# Patient Record
Sex: FEMALE | Race: BLACK OR AFRICAN AMERICAN | NOT HISPANIC OR LATINO
[De-identification: names, ages, dates, MRNs, and addresses within clinical notes are randomized per-mention and may not be internally consistent; named-entity substitution may affect disease eponyms.]

---

## 2017-11-18 ENCOUNTER — RESULT REVIEW (OUTPATIENT)
Age: 22
End: 2017-11-18

## 2019-10-12 ENCOUNTER — RESULT REVIEW (OUTPATIENT)
Age: 24
End: 2019-10-12

## 2020-11-06 ENCOUNTER — TRANSCRIPTION ENCOUNTER (OUTPATIENT)
Age: 25
End: 2020-11-06

## 2020-11-08 ENCOUNTER — TRANSCRIPTION ENCOUNTER (OUTPATIENT)
Age: 25
End: 2020-11-08

## 2021-01-19 ENCOUNTER — OUTPATIENT (OUTPATIENT)
Dept: OUTPATIENT SERVICES | Facility: HOSPITAL | Age: 26
LOS: 1 days | Discharge: HOME | End: 2021-01-19
Payer: COMMERCIAL

## 2021-01-19 DIAGNOSIS — F10.20 ALCOHOL DEPENDENCE, UNCOMPLICATED: ICD-10-CM

## 2021-01-19 DIAGNOSIS — F12.20 CANNABIS DEPENDENCE, UNCOMPLICATED: ICD-10-CM

## 2021-01-29 DIAGNOSIS — F19.950 OTHER PSYCHOACTIVE SUBSTANCE USE, UNSPECIFIED WITH PSYCHOACTIVE SUBSTANCE-INDUCED PSYCHOTIC DISORDER WITH DELUSIONS: ICD-10-CM

## 2021-01-29 DIAGNOSIS — F32.3 MAJOR DEPRESSIVE DISORDER, SINGLE EPISODE, SEVERE WITH PSYCHOTIC FEATURES: ICD-10-CM

## 2021-01-29 DIAGNOSIS — F12.959 CANNABIS USE, UNSPECIFIED WITH PSYCHOTIC DISORDER, UNSPECIFIED: ICD-10-CM

## 2021-02-15 ENCOUNTER — RESULT REVIEW (OUTPATIENT)
Age: 26
End: 2021-02-15

## 2021-03-02 ENCOUNTER — OUTPATIENT (OUTPATIENT)
Dept: OUTPATIENT SERVICES | Facility: HOSPITAL | Age: 26
LOS: 1 days | Discharge: HOME | End: 2021-03-02
Payer: MEDICAID

## 2021-03-02 PROBLEM — Z00.00 ENCOUNTER FOR PREVENTIVE HEALTH EXAMINATION: Status: ACTIVE | Noted: 2021-03-02

## 2021-03-02 PROCEDURE — 90791 PSYCH DIAGNOSTIC EVALUATION: CPT | Mod: 95

## 2021-03-03 ENCOUNTER — APPOINTMENT (OUTPATIENT)
Dept: NEUROLOGY | Facility: CLINIC | Age: 26
End: 2021-03-03
Payer: COMMERCIAL

## 2021-03-03 VITALS
BODY MASS INDEX: 23.55 KG/M2 | DIASTOLIC BLOOD PRESSURE: 80 MMHG | HEART RATE: 104 BPM | SYSTOLIC BLOOD PRESSURE: 123 MMHG | HEIGHT: 62 IN | WEIGHT: 128 LBS

## 2021-03-03 DIAGNOSIS — F20.9 SCHIZOPHRENIA, UNSPECIFIED: ICD-10-CM

## 2021-03-03 DIAGNOSIS — Q04.9 CONGENITAL MALFORMATION OF BRAIN, UNSPECIFIED: ICD-10-CM

## 2021-03-03 PROCEDURE — 99072 ADDL SUPL MATRL&STAF TM PHE: CPT

## 2021-03-03 PROCEDURE — 99205 OFFICE O/P NEW HI 60 MIN: CPT

## 2021-03-03 RX ORDER — OLANZAPINE 20 MG/1
20 TABLET ORAL
Refills: 0 | Status: ACTIVE | COMMUNITY

## 2021-03-05 NOTE — PHYSICAL EXAM
[FreeTextEntry1] : PHYSICAL EXAM\par Constitutional: Alert, no acute distress \par Neck: Full range of motion\par Psychiatric: withdrawn, pressured speech\par Pulmonary: No respiratory distress, stable on room air\par \par NEUROLOGICAL EXAM\par Mental status: The patient is alert, attentive, and oriented.\par Speech/language: clear and fluent \par Cranial nerves:\par CN II: Visual fields are full to confrontation. Pupil size equal and briskly reactive to light. \par CN III, IV, VI: EOMI, no nystagmus, no ptosis (decreased blinking)\par CN V: Facial sensation is intact to pinprick in all 3 divisions bilaterally.\par CN VII: Face is symmetric with normal eye closure and smile.\par CN VII: Hearing is normal to rubbing fingers\par CN IX, X: Palate elevates symmetrically. Phonation is normal.\par CN XI: Head turning and shoulder shrug are intact\par CN XII: Tongue is midline with normal movements and no atrophy.\par Motor: There is no pronator drift of out-stretched arms. Muscle bulk and tone are normal. Strength is full bilaterally. \par 5/5 muscle power at bilat: Deltoid, Biceps, Triceps, Wrist ext, Finger abd, Hip flex, Hip ext, Knee flex, Knee ext, Ankle flex, Ankle ext\par Reflexes: Reflexes are 2+ and symmetric at the biceps, triceps, knees, and ankles. Plantar responses are flexor.\par Sensory: Intact sensations to all sensory modalities in upper and lower extremities\par Coordination: Rapid alternating movements and fine finger movements are intact. There is no dysmetria on finger-to-nose and heel-knee-shin. There are no abnormal or extraneous movements. \par Gait/Stance: Posture is normal. Gait is steady with normal steps, base, arm swing, and turning. Heel and toe walking are normal. Tandem gait is normal. Romberg is absent.\par \par \par \par \par

## 2021-03-05 NOTE — HISTORY OF PRESENT ILLNESS
[FreeTextEntry1] : HPI (initial visit Mar 03, 2021)-Modesta is accompanied by her mother. \par \par Mother provides most of the history. Modesta was very withdrawn and does not speak much and says , "I do not know why am here, I was just diagnosed with Schizophrenia".\par \par Mother reports that back in Nov 2020 Modesta had an allergic skin reaction and prescribed course of steroids by urgent care and dermatologist. Mother is not sure what caused the allergic reaction. \par \par A the following days mom noticed that she was not eating well and one day she walked out of her room and said, "Eden is watching me". She started to have auditory hallucinations and says she was getting signals through her phone. She was admitted to [facility] in Nov 2020 and was diagnosed with a ilya and psychosis. She was in the hospital for 18 days. She was agitated at the hospital. She had a urine test which was positive for THC. 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 Mother was concerned about this finding and hence made the appointment today.\par \par Modesta has been following with psychiatry since discharge. She is on Zyprexa. She continues to report that the government is watching her and is giving her signs. She denies any signs or voices telling her to hurt herself or others. \par \par Mother denies any hx of similar psychotic episodes in the past. She says that Modesta had some mild learning disabilities growing up and was very sensitive. \par \par Mother states, "she is not herself". She is sleeping better now. She stays in her room all day.\par \par Modesta denies any headaches, vision changes or imbalance or weakness. \par \par There is no hx of seizure like activity.\par \par Mother adds that there is a family hx psychiatric illness on her side of the family - siblings/cousins- however she is unclear of the diagnoses and says "they are just mentally off".
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
## 2021-03-23 PROCEDURE — 99214 OFFICE O/P EST MOD 30 MIN: CPT | Mod: 95

## 2021-04-13 DIAGNOSIS — F31.9 BIPOLAR DISORDER, UNSPECIFIED: ICD-10-CM

## 2021-04-13 DIAGNOSIS — F29 UNSPECIFIED PSYCHOSIS NOT DUE TO A SUBSTANCE OR KNOWN PHYSIOLOGICAL CONDITION: ICD-10-CM

## 2021-04-20 ENCOUNTER — TRANSCRIPTION ENCOUNTER (OUTPATIENT)
Age: 26
End: 2021-04-20

## 2021-05-25 PROCEDURE — 99214 OFFICE O/P EST MOD 30 MIN: CPT | Mod: 95

## 2021-06-08 PROCEDURE — 99214 OFFICE O/P EST MOD 30 MIN: CPT | Mod: 95

## 2021-09-14 LAB
A1C WITH ESTIMATED AVERAGE GLUCOSE RESULT: 5.1 % — SIGNIFICANT CHANGE UP (ref 4–5.6)
ALBUMIN SERPL ELPH-MCNC: 5 G/DL — SIGNIFICANT CHANGE UP (ref 3.3–5)
ALP SERPL-CCNC: 96 U/L — SIGNIFICANT CHANGE UP (ref 40–120)
ALT FLD-CCNC: 23 U/L — SIGNIFICANT CHANGE UP (ref 4–33)
ANION GAP SERPL CALC-SCNC: 16 MMOL/L — HIGH (ref 7–14)
AST SERPL-CCNC: 21 U/L — SIGNIFICANT CHANGE UP (ref 4–32)
BASOPHILS # BLD AUTO: 0.03 K/UL — SIGNIFICANT CHANGE UP (ref 0–0.2)
BASOPHILS NFR BLD AUTO: 0.5 % — SIGNIFICANT CHANGE UP (ref 0–2)
BILIRUB SERPL-MCNC: 0.4 MG/DL — SIGNIFICANT CHANGE UP (ref 0.2–1.2)
BUN SERPL-MCNC: 16 MG/DL — SIGNIFICANT CHANGE UP (ref 7–23)
CALCIUM SERPL-MCNC: 10.1 MG/DL — SIGNIFICANT CHANGE UP (ref 8.4–10.5)
CHLORIDE SERPL-SCNC: 101 MMOL/L — SIGNIFICANT CHANGE UP (ref 98–107)
CO2 SERPL-SCNC: 25 MMOL/L — SIGNIFICANT CHANGE UP (ref 22–31)
CREAT SERPL-MCNC: 0.72 MG/DL — SIGNIFICANT CHANGE UP (ref 0.5–1.3)
EOSINOPHIL # BLD AUTO: 0.13 K/UL — SIGNIFICANT CHANGE UP (ref 0–0.5)
EOSINOPHIL NFR BLD AUTO: 2.3 % — SIGNIFICANT CHANGE UP (ref 0–6)
ESTIMATED AVERAGE GLUCOSE: 100 — SIGNIFICANT CHANGE UP
GLUCOSE SERPL-MCNC: 113 MG/DL — HIGH (ref 70–99)
HCT VFR BLD CALC: 39.6 % — SIGNIFICANT CHANGE UP (ref 34.5–45)
HGB BLD-MCNC: 12.7 G/DL — SIGNIFICANT CHANGE UP (ref 11.5–15.5)
IANC: 2.78 K/UL — SIGNIFICANT CHANGE UP (ref 1.5–8.5)
IMM GRANULOCYTES NFR BLD AUTO: 0.4 % — SIGNIFICANT CHANGE UP (ref 0–1.5)
LYMPHOCYTES # BLD AUTO: 2.4 K/UL — SIGNIFICANT CHANGE UP (ref 1–3.3)
LYMPHOCYTES # BLD AUTO: 42.6 % — SIGNIFICANT CHANGE UP (ref 13–44)
MCHC RBC-ENTMCNC: 27.6 PG — SIGNIFICANT CHANGE UP (ref 27–34)
MCHC RBC-ENTMCNC: 32.1 GM/DL — SIGNIFICANT CHANGE UP (ref 32–36)
MCV RBC AUTO: 86.1 FL — SIGNIFICANT CHANGE UP (ref 80–100)
MONOCYTES # BLD AUTO: 0.28 K/UL — SIGNIFICANT CHANGE UP (ref 0–0.9)
MONOCYTES NFR BLD AUTO: 5 % — SIGNIFICANT CHANGE UP (ref 2–14)
NEUTROPHILS # BLD AUTO: 2.78 K/UL — SIGNIFICANT CHANGE UP (ref 1.8–7.4)
NEUTROPHILS NFR BLD AUTO: 49.2 % — SIGNIFICANT CHANGE UP (ref 43–77)
NRBC # BLD: 0 /100 WBCS — SIGNIFICANT CHANGE UP
NRBC # FLD: 0 K/UL — SIGNIFICANT CHANGE UP
PLATELET # BLD AUTO: 215 K/UL — SIGNIFICANT CHANGE UP (ref 150–400)
POTASSIUM SERPL-MCNC: 4.3 MMOL/L — SIGNIFICANT CHANGE UP (ref 3.5–5.3)
POTASSIUM SERPL-SCNC: 4.3 MMOL/L — SIGNIFICANT CHANGE UP (ref 3.5–5.3)
PROT SERPL-MCNC: 8 G/DL — SIGNIFICANT CHANGE UP (ref 6–8.3)
RBC # BLD: 4.6 M/UL — SIGNIFICANT CHANGE UP (ref 3.8–5.2)
RBC # FLD: 13.8 % — SIGNIFICANT CHANGE UP (ref 10.3–14.5)
SODIUM SERPL-SCNC: 142 MMOL/L — SIGNIFICANT CHANGE UP (ref 135–145)
WBC # BLD: 5.64 K/UL — SIGNIFICANT CHANGE UP (ref 3.8–10.5)
WBC # FLD AUTO: 5.64 K/UL — SIGNIFICANT CHANGE UP (ref 3.8–10.5)

## 2022-03-08 LAB
CHOLEST SERPL-MCNC: 208 MG/DL — HIGH
HDLC SERPL-MCNC: 47 MG/DL — LOW
LIPID PNL WITH DIRECT LDL SERPL: 150 MG/DL — HIGH
NON HDL CHOLESTEROL: 161 MG/DL — HIGH
TRIGL SERPL-MCNC: 56 MG/DL — SIGNIFICANT CHANGE UP

## 2022-03-18 LAB
CORTICOSTEROID BINDING GLOBULIN RESULT: 2.3 MG/DL — SIGNIFICANT CHANGE UP
CORTIS F/TOTAL MFR SERPL: 10 % — SIGNIFICANT CHANGE UP
CORTIS SERPL-MCNC: 14 UG/DL — SIGNIFICANT CHANGE UP
CORTISOL, FREE RESULT: 1.4 UG/DL — SIGNIFICANT CHANGE UP

## 2022-08-25 PROCEDURE — ZZZZZ: CPT

## 2022-08-30 ENCOUNTER — APPOINTMENT (OUTPATIENT)
Dept: INFECTIOUS DISEASE | Facility: CLINIC | Age: 27
End: 2022-08-30

## 2022-08-30 DIAGNOSIS — Z71.84 ENC FOR HEALTH COUNSELING RELATED TO TRAVEL: ICD-10-CM

## 2022-08-30 PROCEDURE — 90471 IMMUNIZATION ADMIN: CPT

## 2022-08-30 PROCEDURE — 90717 YELLOW FEVER VACCINE SUBQ: CPT

## 2022-08-30 PROCEDURE — 99401 PREV MED CNSL INDIV APPRX 15: CPT | Mod: 25

## 2022-08-30 RX ORDER — ATOVAQUONE AND PROGUANIL HYDROCHLORIDE 250; 100 MG/1; MG/1
250-100 TABLET, FILM COATED ORAL DAILY
Qty: 20 | Refills: 0 | Status: ACTIVE | COMMUNITY
Start: 2022-08-30 | End: 1900-01-01

## 2022-08-30 RX ORDER — AZITHROMYCIN 500 MG/1
500 TABLET, FILM COATED ORAL
Qty: 3 | Refills: 0 | Status: ACTIVE | COMMUNITY
Start: 2022-08-30 | End: 1900-01-01

## 2022-11-25 DIAGNOSIS — F20.9 SCHIZOPHRENIA, UNSPECIFIED: ICD-10-CM

## 2023-06-21 ENCOUNTER — NON-APPOINTMENT (OUTPATIENT)
Age: 28
End: 2023-06-21

## 2024-06-21 ENCOUNTER — EMERGENCY (EMERGENCY)
Facility: HOSPITAL | Age: 29
LOS: 1 days | Discharge: HOME | End: 2024-06-21
Attending: STUDENT IN AN ORGANIZED HEALTH CARE EDUCATION/TRAINING PROGRAM | Admitting: STUDENT IN AN ORGANIZED HEALTH CARE EDUCATION/TRAINING PROGRAM
Payer: COMMERCIAL

## 2024-06-21 VITALS
DIASTOLIC BLOOD PRESSURE: 61 MMHG | OXYGEN SATURATION: 100 % | RESPIRATION RATE: 18 BRPM | SYSTOLIC BLOOD PRESSURE: 118 MMHG | TEMPERATURE: 98 F | HEART RATE: 64 BPM

## 2024-06-21 DIAGNOSIS — F20.0 PARANOID SCHIZOPHRENIA: ICD-10-CM

## 2024-06-21 PROCEDURE — 99284 EMERGENCY DEPT VISIT MOD MDM: CPT

## 2024-06-21 PROCEDURE — 90792 PSYCH DIAG EVAL W/MED SRVCS: CPT

## 2024-06-21 NOTE — ED BEHAVIORAL HEALTH ASSESSMENT NOTE - OTHER PAST PSYCHIATRIC HISTORY (INCLUDE DETAILS REGARDING ONSET, COURSE OF ILLNESS, INPATIENT/OUTPATIENT TREATMENT)
Hospitalized once in 2020 at [facility] as first episode; has been in outpt tmt since at [facility]

## 2024-06-21 NOTE — ED PROVIDER NOTE - PATIENT PORTAL LINK FT
You can access the FollowMyHealth Patient Portal offered by [facility] by registering at the following website: [email or web address]. By joining [facility]’s FollowMyHealth portal, you will also be able to view your health information using other applications (apps) compatible with our system.

## 2024-06-21 NOTE — ED BEHAVIORAL HEALTH ASSESSMENT NOTE - RISK ASSESSMENT
Risk factors: h/o psych admission, active substance abuse    Protective factors: no current SIIP/HIIP, no h/o SA/SIB,  no access to weapons, good physical health,  engaged in work or school,  domiciled, social supports, engaged in treatment, compliant with treatment, help-seeking behaviors    Overall, pt is a low risk of harm to self/others and does not require psychiatric admission for safety and stabilization.

## 2024-06-21 NOTE — ED ADULT NURSE NOTE - CHIEF COMPLAINT QUOTE
C/o psych evaluation. Hx schizophrenia, compliant with medications. endorses VH tonight, "I saw my whole room shaking, and now I feel fearful...I scared my family." denies AH, SI, HI. pt endorses ETOH use and marijuana use last week. calm and cooperative at this time. pt accompanied by family.

## 2024-06-21 NOTE — ED ADULT TRIAGE NOTE - CHIEF COMPLAINT QUOTE
C/o psych evaluation. Hx schizophrenia, compliant with medications. endorses VH tonight, "I saw my whole room shaking, and now I feel fearful." denies AH, SI, HI. pt endorses ETOH use and marijuana use last week. calm and cooperative at this time. C/o psych evaluation. Hx schizophrenia, compliant with medications. endorses VH tonight, "I saw my whole room shaking, and now I feel fearful...I scared my family." denies AH, SI, HI. pt endorses ETOH use and marijuana use last week. calm and cooperative at this time. pt accompanied by family.

## 2024-06-21 NOTE — ED BEHAVIORAL HEALTH ASSESSMENT NOTE - DESCRIPTION
denies pt is calm, cooperative  Vital Signs Last 24 Hrs  T(C): 36.8 (21 Jun 2024 03:48), Max: 36.8 (21 Jun 2024 03:48)  T(F): 98.3 (21 Jun 2024 03:48), Max: 98.3 (21 Jun 2024 03:48)  HR: 64 (21 Jun 2024 03:48) (64 - 64)  BP: 118/61 (21 Jun 2024 03:48) (118/61 - 118/61)  BP(mean): --  RR: 18 (21 Jun 2024 03:48) (18 - 18)  SpO2: 100% (21 Jun 2024 03:48) (100% - 100%)    Parameters below as of 21 Jun 2024 03:48  Patient On (Oxygen Delivery Method): room air resides with her parents, has one sister; works remotely

## 2024-06-21 NOTE — ED PROVIDER NOTE - PRO INTERPRETER NEED 2
Called patient at [phone] to get her rescheduled for Dr. Haynes. She confirmed to see provider at later date. 7/27/23 per her request with Dr. Haynes confirmed.    English

## 2024-06-21 NOTE — ED PROVIDER NOTE - CLINICAL SUMMARY MEDICAL DECISION MAKING FREE TEXT BOX
Patient evaluated by Psychiatry attending Dr. West.  No indication for labs or imaging at this time.  Patient is calm, cooperative, redirectable, and does not have any evidence of psychiatric emergency requiring admission.  Patient is counseled to increase Risperdal to 3 mg, and psychiatry will reach out to her private psychiatrist regarding this change. Patient is stable for discharge home with parents escorting.

## 2024-06-21 NOTE — ED PROVIDER NOTE - OBJECTIVE STATEMENT
28-year-old female with past history of schizophrenia, compliant with risperidone, brought in by parents from home for single episode of seeing her bookcase and entire room shake 2 hours prior to ED arrival.  Family states that she was in her room when she woke up with these possible visual hallucinations and she ran into the room yelling and behaving abnormally.  Patient states that she last used marijuana and alcohol 7 days ago.  Since then, over the past 6 days family states that she has been making some unusual statements, particularly hyperfocused on numerous pathology.  They state that she has been calling her parents as bone as [place], which is unusual for her.  Of note, patient had endorsed some worsening anxiety and palpitations over the past few days.  They spoke to her psychiatrist to prescribe hydroxyzine.  Patient has been on hydroxyzine over the past 3 days.  Patient denies any other alcohol or substance use since marijuana and alcohol use 7 days ago.  She takes no other home medications.  She currently denies any auditory hallucinations, suicidal ideation, or homicidal ideation.

## 2024-06-21 NOTE — ED BEHAVIORAL HEALTH NOTE - BEHAVIORAL HEALTH NOTE
Dr. Jovani Carrion called unit. Update given on assessment. Per provider, collateral was needed on Pt. SW contacted Pt’s mother at [phone] for collateral information. The mother reported that she took the patient to the ER because the patient woke up and started seeing the entire room shake. Patient told her mother that she was scared and started to yell, and at one point, she started to bark like a dog. The mother reported that Pt lives with her parents, and she works as a remote  . The patient was recently given more responsibility and hours at work, which is stressful for the patient. The patient has no legal involvement. Pt has no significant medical hx, with no hx of si/sa and no hx of NSSIB. The patient used had of marijuana last Thursday and three alcohol drinks. The patient has Hx schizophrenia and is compliant with medications. The patient's psychiatric admission was in 2022, where she was diagnosed with schizophrenia at [facility]. She currently receives treatment at the [facility] at [facility]. Mother shared that she has no safety concerns.

## 2024-06-21 NOTE — ED BEHAVIORAL HEALTH ASSESSMENT NOTE - SUMMARY
Pt is a 29yo female, single, residing at home, currently in tmt at [facility] at [facility], carrying the diagnosis of schizophrenia, who presented to the ED with her family for possibly waking up an feeling that the room was shaking.  On exam, pt is linear, with nml MSE, no acute findings of psychosis, manic, or depression. Pt does feel not herself since using MJ and Etoh last week, but nothing to the extent that has caused a psychotic or depressive episode. Tonight, it is unclear what occurred if it was a dream, hypnopompic/gogic hallucination, or nightmare. However, episode has resolved. PT is advised to f/u with Dr. Alaniz, and call in the am. Secure email will be sent, it is unclear if pt needs an adjustment in her meds, or closer monitoring. At this time, decision can be made by outpt provider, and pt can take extra half tab if she feels it is necessary over the weekend. PT declined voluntary admission and does not meet criteria for an emergency admission at this time against her will.

## 2024-06-21 NOTE — ED BEHAVIORAL HEALTH ASSESSMENT NOTE - HPI (INCLUDE ILLNESS QUALITY, SEVERITY, DURATION, TIMING, CONTEXT, MODIFYING FACTORS, ASSOCIATED SIGNS AND SYMPTOMS)
Pt is a 27yo female, single, residing at home, currently in tmt at [facility] at [facility], carrying the diagnosis of schizophrenia, who presented to the ED with her family for possibly waking up an feeling that the room was shaking.    Pt is sitting on approach, agrees to be interviewed in a room with door open. Pt says that she does not know what happened, whether she was awake or asleep. She suddenly felt the room shakink and belives she saw white and heard a crash. She ran to her father's room and woke up suddenly. He was concerned and they came to the hospital. Pt reports that she has not been feeling 100% at her baseline since smoking MJ and having 3 drinks last Thursday at a concert. Pt says that she has "been a little sad" and she will listen to [employer] songs and cry a bit. Pt also reports that she has not been sleeping well since, but she is going to work. She does say it is harder to concentrate, but she has not missed any work. She denies any A/V/T H, or delusions. Pt denies believing she is being watched or followed, or the govt is monitoring her as she had in the past.   Pt reports no other substance use, and is compliant with her meds. Pt denies any SI/HI/IP.    Pt was last seen 6/6 by Dr. Alaniz, not acute sxs, compliant with meds, f/u in 2 months.    Concern from ED from parents is pt has been into Norse mythology, pt says that she always has had an interest in mythology, watches Vikings with her father. She had a conversation with her mother referencing [place], but she does not believe she or her family are Gods, or have special abilities.    Collateral obtained by SW, see  note.

## 2024-06-21 NOTE — ED ADULT NURSE NOTE - OBJECTIVE STATEMENT
Pt received to  from home. Pt presents pleasant, calm and cooperative. Pt endorses a hx of schizophrenia, states compliance with prescribed Risperdal.  Pt states this morning she woke up, possibly from a dream, and felt "the whole room shaking" and proceeded to run into her parents bedroom, wake them up and tell them about the incident. Pt also endorsed using alcohol and marijuana a week ago but denies using anything since that time. Pt denies SI and HI; also denies VH and AH. Pt belongings secured for safety; pt awaiting psychiatric evaluation.

## 2024-06-21 NOTE — ED BEHAVIORAL HEALTH ASSESSMENT NOTE - ADDITIONAL DETAILS ALL
----- Message from Zina Soto sent at 1/3/2017  3:47 PM CST -----  Contact: Ayah escamilla/ [facility]   [phone]  Pt nurse states she need a refill for levalbuterol (XOPENEX) 0.31 mg/3 mL nebulizer solution. Pt would like prescription called into [facility]# [phone]  fax#[phone]     Please advise     
n/a

## 2024-06-21 NOTE — ED PROVIDER NOTE - PHYSICAL EXAMINATION
Physical Exam  GEN: Alert and oriented x 3, in no acute distress, speaking full clear sentences  HEENT: NC/AT, PERRL, EOMI, normal oropharynx  NECK: Supple, nontender, FROM  CV: RRR, no m/r/g  PULM: CTA bilat, no wheezing/rales/rhonchi  ABD: Soft, nontender, nondistended. No organomegaly  EXTR: FROM to all extremities, nontender, no edema  SKIN: Warm, dry, no rash  NEURO: AOx3, speaking full clear sentences, BEATTY 5/5 strength, ambulating with stable gait

## 2024-06-21 NOTE — ED PROVIDER NOTE - NSFOLLOWUPINSTRUCTIONS_ED_ALL_ED_FT
1. TAKE ALL OF YOUR PRESCRIBED OR OVER THE COUNTER MEDICATIONS AS DIRECTED.    2. FOR PAIN OR FEVER YOU CAN TAKE IBUPROFEN (MOTRIN, ADVIL), NAPROXEN (ALEVE) OR ACETAMINOPHEN (TYLENOL) AS NEEDED, AS DIRECTED ON PACKAGING.  3. FOLLOW UP WITH YOUR PRIMARY DOCTOR WITHIN 5 DAYS, OR SOONER IF DIRECTED.  4. IF YOU HAD LABS OR IMAGING DONE, YOU WERE GIVEN COPIES OF ALL LABS AND/OR IMAGING RESULTS FROM YOUR ER VISIT--PLEASE TAKE THEM WITH YOU TO YOUR FOLLOW UP APPOINTMENTS.  5. IF NEEDED, CALL PATIENT ACCESS SERVICES AT [phone] TO FIND A PRIMARY CARE PHYSICIAN.  OR CALL [phone] TO MAKE AN APPOINTMENT WITH THE CLINIC.  6. YOU CAN FIND PHYSICIANS OF ALL SPECIALITIES BY VISITING [facility] AND CLICKING ON "FIND A DOCTOR".  7. RETURN TO THE ER FOR ANY WORSENING SYMPTOMS OR CONCERNS.    THANK YOU FOR COMING TO [facility].  HAVE A NICE DAY.

## 2024-09-03 PROCEDURE — 99214 OFFICE O/P EST MOD 30 MIN: CPT | Mod: 95

## 2024-10-01 PROCEDURE — 90833 PSYTX W PT W E/M 30 MIN: CPT | Mod: 95

## 2024-10-01 PROCEDURE — 99214 OFFICE O/P EST MOD 30 MIN: CPT | Mod: 95

## 2025-01-14 PROCEDURE — 90832 PSYTX W PT 30 MINUTES: CPT | Mod: 93

## 2025-02-06 NOTE — ED ADULT NURSE NOTE - SUICIDE SCREENING QUESTION 3
----- Message from Sri sent at 2/6/2025  9:13 AM CST -----  Regarding: pt advice- cancer growth  [phone] - call back; was discharge from ER yesterday 2/5/2025; They did some tests on her and results indicated she have cancer growth. Offered 1 week hospital f/u with Jacy she declined ,advised Dr.Miller roet be back till Feb19, requested Giovani to give her a call back      Sri   No

## 2025-03-11 PROCEDURE — 90834 PSYTX W PT 45 MINUTES: CPT | Mod: 93

## 2025-06-10 PROCEDURE — 90832 PSYTX W PT 30 MINUTES: CPT | Mod: 93

## 2025-06-16 PROCEDURE — 90833 PSYTX W PT W E/M 30 MIN: CPT | Mod: 93

## 2025-06-16 PROCEDURE — 99214 OFFICE O/P EST MOD 30 MIN: CPT | Mod: 95

## 2025-07-29 PROCEDURE — 90832 PSYTX W PT 30 MINUTES: CPT | Mod: 93

## 2025-08-05 PROCEDURE — 90832 PSYTX W PT 30 MINUTES: CPT | Mod: 93

## 2025-08-26 PROCEDURE — 90834 PSYTX W PT 45 MINUTES: CPT | Mod: 93

## 2025-09-02 PROCEDURE — 90832 PSYTX W PT 30 MINUTES: CPT | Mod: 93

## 2025-09-09 PROCEDURE — 90832 PSYTX W PT 30 MINUTES: CPT | Mod: 93
